# Patient Record
Sex: FEMALE | Race: WHITE | NOT HISPANIC OR LATINO | Employment: UNEMPLOYED | URBAN - METROPOLITAN AREA
[De-identification: names, ages, dates, MRNs, and addresses within clinical notes are randomized per-mention and may not be internally consistent; named-entity substitution may affect disease eponyms.]

---

## 2022-01-19 ENCOUNTER — EVALUATION (OUTPATIENT)
Dept: PHYSICAL THERAPY | Facility: MEDICAL CENTER | Age: 22
End: 2022-01-19
Payer: COMMERCIAL

## 2022-01-19 DIAGNOSIS — M54.12 CERVICAL RADICULOPATHY: Primary | ICD-10-CM

## 2022-01-19 PROCEDURE — 97162 PT EVAL MOD COMPLEX 30 MIN: CPT | Performed by: PHYSICAL THERAPIST
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
## 2022-01-25 ENCOUNTER — OFFICE VISIT (OUTPATIENT)
Dept: PHYSICAL THERAPY | Facility: MEDICAL CENTER | Age: 22
End: 2022-01-25
Payer: COMMERCIAL

## 2022-01-25 DIAGNOSIS — M54.12 CERVICAL RADICULOPATHY: Primary | ICD-10-CM

## 2022-01-25 PROCEDURE — 97140 MANUAL THERAPY 1/> REGIONS: CPT | Performed by: PHYSICAL THERAPIST

## 2022-01-25 PROCEDURE — 97010 HOT OR COLD PACKS THERAPY: CPT | Performed by: PHYSICAL THERAPIST

## 2022-01-25 NOTE — PROGRESS NOTES
Daily Note     Today's date: 2022  Patient name: Kelli Young  : 2000  MRN: [id number]  Referring provider: Analisa Busch PA-C  Dx:   Encounter Diagnosis     ICD-10-CM    1  Cervical radiculopathy  M54 12                   Subjective: patient states that she has been feeling better since her eval   Pain today is 3/10      Objective: See treatment diary below      Assessment: Tolerated treatment well  Patient exhibited good technique with therapeutic exercises and would benefit from continued PT  Focus on manual therapy  Decreased pain and improved ROM following treatment  Plan: Continue per plan of care  Precautions: none  Daily Treatment Diary     Manual              C3-7 UPA right  Gr   Iv- 10sec x5            theragun 10min                                                       Exercise Diary              UBE             pec stretch             No monnies             Shoulder extension             Scapular retractions             Horizontal abduction             Chin tucks             Thoracic extension over foam roller             Foam roller on wall                                                                                                                                                                Modalities              heat 10min

## 2022-02-01 ENCOUNTER — APPOINTMENT (OUTPATIENT)
Dept: PHYSICAL THERAPY | Facility: MEDICAL CENTER | Age: 22
End: 2022-02-01
Payer: COMMERCIAL

## 2022-02-02 ENCOUNTER — OFFICE VISIT (OUTPATIENT)
Dept: PHYSICAL THERAPY | Facility: MEDICAL CENTER | Age: 22
End: 2022-02-02
Payer: COMMERCIAL

## 2022-02-02 DIAGNOSIS — M54.12 CERVICAL RADICULOPATHY: Primary | ICD-10-CM

## 2022-02-02 PROCEDURE — 97140 MANUAL THERAPY 1/> REGIONS: CPT | Performed by: PHYSICAL THERAPIST

## 2022-02-02 PROCEDURE — 97010 HOT OR COLD PACKS THERAPY: CPT | Performed by: PHYSICAL THERAPIST

## 2022-02-02 PROCEDURE — 97110 THERAPEUTIC EXERCISES: CPT | Performed by: PHYSICAL THERAPIST

## 2022-02-02 NOTE — PROGRESS NOTES
Daily Note     Today's date: 2022  Patient name: Maci Osborne  : 2000  MRN: [id number]  Referring provider: Juancho Valle PA-C  Dx:   Encounter Diagnosis     ICD-10-CM    1  Cervical radiculopathy  M54 12                   Subjective: patient states that she has been feeling better since her eval   Pain today is 3/10      Objective: See treatment diary below      Assessment: Tolerated treatment well  Patient exhibited good technique with therapeutic exercises and would benefit from continued PT  Focus on manual therapy  Decreased pain and improved ROM following treatment  Plan: Continue per plan of care  Precautions: none  Daily Treatment Diary     Manual              C3-7 UPA right  Gr   Iv- 10sec x5            theragun 10min                                                       Exercise Diary              UBE             pec stretch 10sec x5            No monnies             Shoulder extension 10x2            Scapular retractions 10x2            Horizontal abduction 10x2            Chin tucks             Thoracic extension over foam roller             Foam roller on wall                                                                                                                                                                Modalities              heat 10min

## 2022-02-08 ENCOUNTER — APPOINTMENT (OUTPATIENT)
Dept: PHYSICAL THERAPY | Facility: MEDICAL CENTER | Age: 22
End: 2022-02-08
Payer: COMMERCIAL

## 2022-02-10 ENCOUNTER — APPOINTMENT (OUTPATIENT)
Dept: PHYSICAL THERAPY | Facility: MEDICAL CENTER | Age: 22
End: 2022-02-10
Payer: COMMERCIAL

## 2022-02-11 ENCOUNTER — OFFICE VISIT (OUTPATIENT)
Dept: PHYSICAL THERAPY | Facility: MEDICAL CENTER | Age: 22
End: 2022-02-11
Payer: COMMERCIAL

## 2022-02-11 DIAGNOSIS — M54.12 CERVICAL RADICULOPATHY: Primary | ICD-10-CM

## 2022-02-11 PROCEDURE — 97110 THERAPEUTIC EXERCISES: CPT | Performed by: PHYSICAL THERAPIST

## 2022-02-11 PROCEDURE — 97140 MANUAL THERAPY 1/> REGIONS: CPT | Performed by: PHYSICAL THERAPIST

## 2022-02-11 PROCEDURE — 97010 HOT OR COLD PACKS THERAPY: CPT | Performed by: PHYSICAL THERAPIST

## 2022-02-11 NOTE — PROGRESS NOTES
Daily Note     Today's date: 2022  Patient name: Griselda Long  : 2000  MRN: [id number]  Referring provider: Murali Tian PA-C  Dx:   Encounter Diagnosis     ICD-10-CM    1  Cervical radiculopathy  M54 12                   Subjective: patient states that she is having more pain today after helping her dad move some things in storage  5/10 pain in the right upper trap  Objective: See treatment diary below      Assessment: Tolerated treatment well  Patient exhibited good technique with therapeutic exercises and would benefit from continued PT  Focus on manual therapy  Decreased pain and improved ROM following treatment  Increased muscular tightness addressed with foam rolling which was tolerated well  Plan: Continue per plan of care  Precautions: none  Daily Treatment Diary     Manual              C3-7 UPA right  Gr   Iv- 10sec x5            theragun 10min                                                       Exercise Diary              UBE             pec stretch 10sec x5            No monnies             Shoulder extension 10x2            Scapular retractions 10x2            Horizontal abduction 10x2            Chin tucks             Thoracic extension over foam roller 10x2            Foam roller on wall 10min                                                                                                                                                               Modalities              heat 10min

## 2022-02-15 ENCOUNTER — APPOINTMENT (OUTPATIENT)
Dept: PHYSICAL THERAPY | Facility: MEDICAL CENTER | Age: 22
End: 2022-02-15
Payer: COMMERCIAL

## 2022-02-22 ENCOUNTER — OFFICE VISIT (OUTPATIENT)
Dept: PHYSICAL THERAPY | Facility: MEDICAL CENTER | Age: 22
End: 2022-02-22
Payer: COMMERCIAL

## 2022-02-22 DIAGNOSIS — M54.12 CERVICAL RADICULOPATHY: Primary | ICD-10-CM

## 2022-02-22 PROCEDURE — 97010 HOT OR COLD PACKS THERAPY: CPT | Performed by: PHYSICAL THERAPIST

## 2022-02-22 PROCEDURE — 97140 MANUAL THERAPY 1/> REGIONS: CPT | Performed by: PHYSICAL THERAPIST

## 2022-02-22 PROCEDURE — 97110 THERAPEUTIC EXERCISES: CPT | Performed by: PHYSICAL THERAPIST

## 2022-02-22 NOTE — PROGRESS NOTES
Daily Note     Today's date: 2022  Patient name: Natalie Rendon  : 2000  MRN: [id number]  Referring provider: Kt Mae PA-C  Dx:   Encounter Diagnosis     ICD-10-CM    1  Cervical radiculopathy  M54 12                   Subjective: patient states that she is having less arm symptoms and tingling is happening infrequently  4/10 pain in the right upper trap  Objective: See treatment diary below      Assessment: Tolerated treatment well  Patient exhibited good technique with therapeutic exercises and would benefit from continued PT  Focus on manual therapy  Decreased pain and improved ROM following treatment  Plan: Continue per plan of care  Precautions: none  Daily Treatment Diary     Manual              C3-7 UPA right  Gr   Iv- 10sec x5            theragun 10min            Manual traction 30sec x5                                          Exercise Diary              UBE             pec stretch 10sec x5            No monnies             Shoulder extension 10x2            Scapular retractions 10x2            Horizontal abduction 10x2            Chin tucks             Thoracic extension over foam roller 10x2            Foam roller on wall 10min                                                                                                                                                               Modalities              heat 10min

## 2022-03-01 ENCOUNTER — OFFICE VISIT (OUTPATIENT)
Dept: PHYSICAL THERAPY | Facility: MEDICAL CENTER | Age: 22
End: 2022-03-01
Payer: COMMERCIAL

## 2022-03-01 DIAGNOSIS — M54.12 CERVICAL RADICULOPATHY: Primary | ICD-10-CM

## 2022-03-01 PROCEDURE — 97140 MANUAL THERAPY 1/> REGIONS: CPT | Performed by: PHYSICAL THERAPIST

## 2022-03-01 PROCEDURE — 97010 HOT OR COLD PACKS THERAPY: CPT | Performed by: PHYSICAL THERAPIST

## 2022-03-01 PROCEDURE — 97110 THERAPEUTIC EXERCISES: CPT | Performed by: PHYSICAL THERAPIST

## 2022-03-01 NOTE — PROGRESS NOTES
Daily Note     Today's date: 3/1/2022  Patient name: Rogerio Carbajal  : 2000  MRN: [id number]  Referring provider: Shaina Dixon PA-C  Dx:   Encounter Diagnosis     ICD-10-CM    1  Cervical radiculopathy  M54 12                   Subjective: patient states that she is having less arm symptoms and tingling is happening infrequently  Neck and upper back symptoms are improved  Objective: See treatment diary below      Assessment: Tolerated treatment well  Patient exhibited good technique with therapeutic exercises and would benefit from continued PT  Focus on manual therapy  Decreased pain and improved ROM following treatment  Progressing with cervical motion and scapular strength  Plan: Continue per plan of care  Precautions: none  Daily Treatment Diary     Manual              C3-7 UPA right  Gr   Iv- 10sec x5            theragun 10min            Manual traction 30sec x5                                          Exercise Diary              UBE             pec stretch 10sec x5            No monnies             Shoulder extension 10x2            Scapular retractions 10x2            Horizontal abduction 10x2            Chin tucks             Thoracic extension over foam roller 10x2            Foam roller on wall 10min                                                                                                                                                               Modalities              heat 10min

## 2022-03-09 ENCOUNTER — APPOINTMENT (OUTPATIENT)
Dept: PHYSICAL THERAPY | Facility: MEDICAL CENTER | Age: 22
End: 2022-03-09
Payer: COMMERCIAL

## 2022-03-15 ENCOUNTER — APPOINTMENT (OUTPATIENT)
Dept: PHYSICAL THERAPY | Facility: MEDICAL CENTER | Age: 22
End: 2022-03-15
Payer: COMMERCIAL